# Patient Record
Sex: FEMALE | Race: WHITE | NOT HISPANIC OR LATINO | Employment: FULL TIME | URBAN - NONMETROPOLITAN AREA
[De-identification: names, ages, dates, MRNs, and addresses within clinical notes are randomized per-mention and may not be internally consistent; named-entity substitution may affect disease eponyms.]

---

## 2017-01-17 PROBLEM — C50.919 BREAST CANCER: Status: ACTIVE | Noted: 2017-01-17

## 2017-01-17 PROBLEM — R23.2 HOT FLASHES: Status: ACTIVE | Noted: 2017-01-17

## 2017-01-17 PROBLEM — M79.651 RIGHT THIGH PAIN: Status: ACTIVE | Noted: 2017-01-17

## 2017-01-23 RX ORDER — ANASTROZOLE 1 MG/1
1 TABLET ORAL DAILY
Qty: 90 TABLET | Refills: 3 | Status: SHIPPED | OUTPATIENT
Start: 2017-01-23 | End: 2018-01-22 | Stop reason: SDUPTHER

## 2017-02-10 ENCOUNTER — OFFICE VISIT (OUTPATIENT)
Dept: ONCOLOGY | Facility: CLINIC | Age: 62
End: 2017-02-10

## 2017-02-10 VITALS
DIASTOLIC BLOOD PRESSURE: 69 MMHG | HEIGHT: 67 IN | RESPIRATION RATE: 14 BRPM | WEIGHT: 168 LBS | TEMPERATURE: 98.9 F | SYSTOLIC BLOOD PRESSURE: 138 MMHG | BODY MASS INDEX: 26.37 KG/M2 | HEART RATE: 74 BPM

## 2017-02-10 DIAGNOSIS — Z85.3 HISTORY OF LEFT BREAST CANCER: Primary | ICD-10-CM

## 2017-02-10 PROCEDURE — 99213 OFFICE O/P EST LOW 20 MIN: CPT | Performed by: NURSE PRACTITIONER

## 2017-02-10 RX ORDER — DICLOFENAC SODIUM 75 MG/1
TABLET, DELAYED RELEASE ORAL
Refills: 3 | COMMUNITY
Start: 2017-01-29

## 2017-02-10 RX ORDER — VENLAFAXINE 37.5 MG/1
37.5 TABLET ORAL DAILY
COMMUNITY
End: 2017-04-25 | Stop reason: SDUPTHER

## 2017-02-10 RX ORDER — PRAVASTATIN SODIUM 80 MG/1
TABLET ORAL
Refills: 0 | COMMUNITY
Start: 2017-01-23

## 2017-02-10 RX ORDER — EZETIMIBE 10 MG/1
TABLET ORAL
Refills: 1 | COMMUNITY
Start: 2017-01-31

## 2017-02-10 RX ORDER — LANOLIN ALCOHOL/MO/W.PET/CERES
1000 CREAM (GRAM) TOPICAL DAILY
COMMUNITY

## 2017-02-10 RX ORDER — MELATONIN
1000 DAILY
COMMUNITY

## 2017-02-10 RX ORDER — ASPIRIN 81 MG/1
81 TABLET ORAL DAILY
COMMUNITY

## 2017-02-10 NOTE — PROGRESS NOTES
CHIEF COMPLAINT:  1.  Nausea and vomiting with missed doses of Effexor                                         2.  Follow-up for history of breast cancer    Problem List:  Oncology/Hematology History    1. Breast cancer: Stage IA, [id number], invasive ductal carcinoma of the left  breast. Tumor size 1.1 cm, 2 sentinel nodes removed which were negative, ER/W[place] positive, HER-2/quita negative. Status post lumpectomy and sentinel node dissection 08/14/2013.   a) Began Arimidex 09/2013.   b) Completed postlumpectomy radiation under the direction of Dr. Salomon Mascorro  10/07/2013 through 11/15/2013.   c) BRCA negative per outside report.   d) Low risk Oncotype score of 13 with low risk of distant recurrence of 9%.   2. History of right hip replacement:  a) Has had intermittent right thigh pain since that time followed by  orthopedic surgeon.  3) Hot flashes, worse at night.        Breast cancer    7/31/2013 Initial Diagnosis    Left breast cancer         HISTORY OF PRESENT ILLNESS:  The patient is a 61 y.o. female, here for follow up on management of history of left breast cancer.  Sister states that overall she has been doing well since we saw her last with no new concerns or changes in her health.  She did note however when she has forgotten to take her Effexor that she experiences nausea and feeling poorly.  This resolves when she takes her Effexor.  She states that while she was not trying to stop the medication, she had just forgotten a dose or 2, she is concerned about the side effects and is interested in tapering off.  She does state that they have helped with her hot flashes but her concerns over the side effect outweigh that.  Tolerating Arimidex without side effects.  Previous thigh pain she had been having resolved spontaneously and has not returned.      History reviewed. No pertinent past medical history.  Past Surgical History   Procedure Laterality Date   • Total hip arthroplasty         Allergies   Allergen  "Reactions   • Other      ivp dye    • Sulfa Antibiotics        Family History and Social History reviewed and changed as necessary      REVIEW OF SYSTEM:   Review of Systems   Constitutional: Negative for appetite change, chills, diaphoresis, fatigue, fever and unexpected weight change.   HENT:   Negative for mouth sores, sore throat and trouble swallowing.    Eyes: Negative for icterus.   Respiratory: Negative for cough, hemoptysis and shortness of breath.    Cardiovascular: Negative for chest pain, leg swelling and palpitations.   Gastrointestinal: Negative for abdominal distention, abdominal pain, blood in stool, constipation, diarrhea, nausea and vomiting.   Endocrine: Negative for hot flashes.   Genitourinary: Negative for bladder incontinence, difficulty urinating, dysuria, frequency and hematuria.    Musculoskeletal: Negative for gait problem, neck pain and neck stiffness.   Skin: Negative for rash.   Neurological: Negative for dizziness, gait problem, headaches, light-headedness and numbness.   Hematological: Negative for adenopathy. Does not bruise/bleed easily.   Psychiatric/Behavioral: Negative for depression. The patient is not nervous/anxious.    All other systems reviewed and are negative except as stated above in history of present illness.       PHYSICAL EXAM    Vitals:    02/10/17 0902   BP: 138/69   Pulse: 74   Resp: 14   Temp: 98.9 °F (37.2 °C)   Weight: 168 lb (76.2 kg)   Height: 66.5\" (168.9 cm)     Constitutional: Appears well-developed and well-nourished. No distress.   ECOG: (0) Fully active, able to carry on all predisease performance without restriction  HENT:   Head: Normocephalic.   Mouth/Throat: Oropharynx is clear and moist.   Eyes: Conjunctivae are normal. Pupils are equal, round, and reactive. No scleral icterus.   Neck: Neck supple. No JVD present.    Cardiovascular: Normal rate, regular rhythm and normal heart sounds.    Breast: Breast exam done both in the sitting and supine " position was benign, breast are asymmetric with the left smaller than the right, breast tissue is soft, no abnormal masses or nodules.  Stable lumpectomy and axillary scar bed in the left breast and axilla.  Skin is smooth with no dimpling, nipples are without retraction or discharge.  Pulmonary/Chest: Breath sounds normal. No respiratory distress.   Nodes: No cervical, supraclavicular or axillary nodes palpable on exam.  Abdominal: Soft. Exhibits no distension and no mass. There is no tenderness.   Musculoskeletal:Exhibits no edema, tenderness or deformity.   Neurological: Alert and oriented to person, place, and time. Exhibits normal muscle tone.   Skin: No ecchymosis, no petechiae and no rash noted. Not diaphoretic. No cyanosis. Nails show no clubbing.   Psychiatric: Normal mood and affect.   Vitals reviewed.    Diagnostic data: All previous office notes, and radiology reports including 7/18/16 bilateral diagnostic mammogram were reviewed at visit, July mammogram was BI-RADS 2.    Assessment/Plan     1.  History of left breast cancer  2.  History of hot flashes, better controlled with Effexor but nausea and vomiting with missed doses of Effexor    Discussion: The patient has no evidence of disease on clinical exam and no new worrisome symptoms.  Her last mammogram in July was negative, BI-RADS 2.  She will be due for her next annual mammogram in July which I have ordered today as a screening mammogram.  We will plan on seeing her back in 1 year for follow-up.  She will continue on Arimidex unchanged through September 2018, for 5 years adjuvant therapy.  She will need bone density testing if not done in the past 2-3 years.  In regards to the nausea she has one missing a dose of her Effexor, I discussed with her the best way to come off of the medication would be to take 1 capsule every other day for a week and then stop.  She may still yet experience nausea but we'll just have to work through this for a few days  until it resolves.         Errors in dictation may reflect use of voice recognition software and not all errors in transcription may have been detected prior to signing.    Kirsten Katz, APRN    02/10/2017

## 2017-04-20 ENCOUNTER — TELEPHONE (OUTPATIENT)
Dept: ONCOLOGY | Facility: CLINIC | Age: 62
End: 2017-04-20

## 2017-04-20 NOTE — TELEPHONE ENCOUNTER
----- Message from Julissa eBcerra sent at 4/20/2017  8:26 AM EDT -----  Regarding: morgan   lump on breast  Contact: [phone]  Pt would like a rtn call  She found a lump on her breast.  Hx breast ca.  She works at [employer] but has her cell phone.

## 2017-04-20 NOTE — TELEPHONE ENCOUNTER
Discussed with Dr. Gould.  He is happy to see her, but would just send her back to her surgeon.  He suggested she start with Dr. Bowden.  She will call his office.  I asked her to call me back with update about what he thought was going on.  I told her we could see her today or tomorrow if she is unable to get in to see Dr. Bowden.

## 2017-04-25 RX ORDER — VENLAFAXINE HYDROCHLORIDE 37.5 MG/1
37.5 CAPSULE, EXTENDED RELEASE ORAL DAILY
Qty: 90 CAPSULE | Refills: 3 | Status: SHIPPED | OUTPATIENT
Start: 2017-04-25 | End: 2018-04-05 | Stop reason: SDUPTHER

## 2017-04-25 RX ORDER — VENLAFAXINE 37.5 MG/1
37.5 TABLET ORAL DAILY
Qty: 90 TABLET | Refills: 3 | Status: SHIPPED | OUTPATIENT
Start: 2017-04-25 | End: 2017-04-25

## 2018-01-22 RX ORDER — ANASTROZOLE 1 MG/1
1 TABLET ORAL DAILY
Qty: 90 TABLET | Refills: 2 | Status: SHIPPED | OUTPATIENT
Start: 2018-01-22

## 2018-01-25 ENCOUNTER — TELEPHONE (OUTPATIENT)
Dept: ONCOLOGY | Facility: CLINIC | Age: 63
End: 2018-01-25

## 2018-01-25 NOTE — TELEPHONE ENCOUNTER
----- Message from Trisha MARRUFO Mayito sent at 1/25/2018 11:13 AM EST -----  Regarding: FW: HILARY-REFERRAL  Contact: [phone]      ----- Message -----     From: George Gould MD     Sent: 1/25/2018  11:01 AM       To: Trisha MARRUFO Mayito  Subject: RE: HILARY-REFERRAL                               Tell her to get with her primary care in her local area and they need to make referral.  The primary care can do the mammography and exam but they will know the medical oncologists are in that region.  I do not.  ----- Message -----     From: Lissy Bueno RN     Sent: 1/25/2018   8:29 AM       To: George Gould MD  Subject: FW: HILARY-REFERRAL                                   ----- Message -----     From: Trisha MARRUFO Mayito     Sent: 1/24/2018  11:32 AM       To: [facility]  Subject: HILARY-REFERRAL                                   Patient has moved 3 hours away and she needs a referral to another Oncologist would like a recommendation?

## 2018-04-05 RX ORDER — VENLAFAXINE HYDROCHLORIDE 37.5 MG/1
37.5 CAPSULE, EXTENDED RELEASE ORAL DAILY
Qty: 90 CAPSULE | Refills: 3 | Status: SHIPPED | OUTPATIENT
Start: 2018-04-05

## 2023-06-07 ENCOUNTER — OFFICE VISIT (OUTPATIENT)
Dept: NEUROLOGY | Facility: CLINIC | Age: 68
End: 2023-06-07
Payer: MEDICARE

## 2023-06-07 VITALS
SYSTOLIC BLOOD PRESSURE: 122 MMHG | OXYGEN SATURATION: 98 % | HEIGHT: 66 IN | WEIGHT: 168.2 LBS | DIASTOLIC BLOOD PRESSURE: 78 MMHG | HEART RATE: 68 BPM | BODY MASS INDEX: 27.03 KG/M2

## 2023-06-07 DIAGNOSIS — F41.9 ANXIETY: ICD-10-CM

## 2023-06-07 DIAGNOSIS — R41.3 MEMORY LOSS: Primary | ICD-10-CM

## 2023-06-07 PROBLEM — F32.A DEPRESSION: Status: ACTIVE | Noted: 2023-06-07

## 2023-06-07 PROCEDURE — 99204 OFFICE O/P NEW MOD 45 MIN: CPT | Performed by: PSYCHIATRY & NEUROLOGY

## 2023-06-07 PROCEDURE — 1159F MED LIST DOCD IN RCRD: CPT | Performed by: PSYCHIATRY & NEUROLOGY

## 2023-06-07 PROCEDURE — 1160F RVW MEDS BY RX/DR IN RCRD: CPT | Performed by: PSYCHIATRY & NEUROLOGY

## 2023-06-07 RX ORDER — FLUOXETINE HYDROCHLORIDE 20 MG/1
CAPSULE ORAL EVERY 24 HOURS
COMMUNITY
End: 2023-06-07

## 2023-06-07 RX ORDER — ESCITALOPRAM OXALATE 10 MG/1
TABLET ORAL
COMMUNITY
Start: 2023-04-28 | End: 2023-06-07

## 2023-06-07 RX ORDER — ROSUVASTATIN CALCIUM 5 MG/1
5 TABLET, COATED ORAL DAILY
COMMUNITY

## 2023-06-07 RX ORDER — GLUCOSAMINE/CHONDR SU A SOD 750-600 MG
2 TABLET ORAL DAILY
COMMUNITY
End: 2023-06-07

## 2023-06-07 RX ORDER — MULTIPLE VITAMINS W/ MINERALS TAB 9MG-400MCG
1 TAB ORAL DAILY
COMMUNITY

## 2023-06-07 NOTE — PROGRESS NOTES
Subjective   Patient ID: Yajaira Huerta is a 68 y.o. female     Chief Complaint   Patient presents with    Memory Loss        History of Present Illness    68 y.o. female referred by Gerry Alvarado MD for AMS.       and stopped working.      Increased anxiety and mild depression.      Trouble with loud spaces.     Tearful.      Trial of Lexapro, Effexor.      MMSE 29     Breast cancer tx with radiation and lumpectomy.    Feet hurt and left heel numb.      Reviewed medical records:    Family reports cognitive changes.  Sx progressed started in .  Forgets names. Loses train of thought.  Cannot find car in parking lot.  Trouble with routine tasks.        Past Medical History:   Diagnosis Date    Breast cancer     Depression      History reviewed. No pertinent family history.  Social History     Socioeconomic History    Marital status:    Tobacco Use    Smoking status: Never     Passive exposure: Never    Smokeless tobacco: Never   Vaping Use    Vaping Use: Never used   Substance and Sexual Activity    Alcohol use: No    Drug use: Defer    Sexual activity: Defer       Review of Systems   Constitutional:  Negative for activity change, fatigue and unexpected weight change.   HENT:  Negative for tinnitus and trouble swallowing.    Eyes:  Negative for photophobia and visual disturbance.   Respiratory:  Negative for apnea, cough and choking.    Cardiovascular:  Negative for leg swelling.   Gastrointestinal:  Negative for nausea and vomiting.   Endocrine: Negative for cold intolerance and heat intolerance.   Genitourinary:  Negative for difficulty urinating, frequency, menstrual problem and urgency.   Musculoskeletal:  Negative for back pain, gait problem, myalgias and neck pain.   Skin:  Negative for color change and rash.   Allergic/Immunologic: Negative for immunocompromised state.   Neurological:  Positive for speech difficulty. Negative for dizziness, tremors, seizures, syncope, facial  "asymmetry, weakness, light-headedness, numbness and headaches.   Hematological:  Negative for adenopathy. Does not bruise/bleed easily.   Psychiatric/Behavioral:  Positive for confusion and decreased concentration. Negative for behavioral problems, hallucinations and sleep disturbance. The patient is nervous/anxious.      Objective     Vitals:    06/07/23 0931   BP: 122/78   Pulse: 68   SpO2: 98%   Weight: 76.3 kg (168 lb 3.2 oz)   Height: 168.9 cm (66.5\")       Neurologic Exam     Mental Status   Oriented to person, place, and time.   Speech: speech is normal   Level of consciousness: alert  Knowledge: good and consistent with education.   Normal comprehension.     Cranial Nerves   Cranial nerves II through XII intact.     CN II   Visual fields full to confrontation.   Visual acuity: normal  Right visual field deficit: none  Left visual field deficit: none     CN III, IV, VI   Pupils are equal, round, and reactive to light.  Extraocular motions are normal.   Nystagmus: none   Diplopia: none  Ophthalmoparesis: none  Upgaze: normal  Downgaze: normal  Conjugate gaze: present    CN V   Facial sensation intact.   Right corneal reflex: normal  Left corneal reflex: normal    CN VII   Right facial weakness: none  Left facial weakness: none    CN VIII   Hearing: intact    CN IX, X   Palate: symmetric  Right gag reflex: normal  Left gag reflex: normal    CN XI   Right sternocleidomastoid strength: normal  Left sternocleidomastoid strength: normal    CN XII   Tongue: not atrophic  Fasciculations: absent  Tongue deviation: none    Motor Exam   Muscle bulk: normal  Overall muscle tone: normal    Strength   Strength 5/5 throughout.     Sensory Exam   Light touch normal.     Gait, Coordination, and Reflexes     Gait  Gait: normal    Tremor   Resting tremor: absent  Intention tremor: absent  Action tremor: absent    Reflexes   Reflexes 2+ except as noted.     Physical Exam  Eyes:      Extraocular Movements: EOM normal.      " Pupils: Pupils are equal, round, and reactive to light.   Neurological:      Mental Status: She is oriented to person, place, and time.      Cranial Nerves: Cranial nerves 2-12 are intact.      Motor: Motor strength is normal.      Gait: Gait is intact.   Psychiatric:         Speech: Speech normal.       No results found for any previous visit.         Assessment & Plan     Problem List Items Addressed This Visit          Neuro    Memory loss - Primary (Chronic)    Current Assessment & Plan     Sx secondary to anxiety     Refer to Psychiatry                No follow-ups on file.

## 2024-08-22 ENCOUNTER — OFFICE VISIT (OUTPATIENT)
Dept: OBSTETRICS AND GYNECOLOGY | Facility: CLINIC | Age: 69
End: 2024-08-22
Payer: MEDICARE

## 2024-08-22 VITALS — SYSTOLIC BLOOD PRESSURE: 120 MMHG | DIASTOLIC BLOOD PRESSURE: 80 MMHG | WEIGHT: 141 LBS

## 2024-08-22 DIAGNOSIS — N64.9 DISORDER OF BREAST, UNSPECIFIED: ICD-10-CM

## 2024-08-22 DIAGNOSIS — N63.10 MASS OF RIGHT BREAST, UNSPECIFIED QUADRANT: Primary | ICD-10-CM

## 2024-08-22 PROCEDURE — 1159F MED LIST DOCD IN RCRD: CPT | Performed by: NURSE PRACTITIONER

## 2024-08-22 PROCEDURE — 99202 OFFICE O/P NEW SF 15 MIN: CPT | Performed by: NURSE PRACTITIONER

## 2024-08-22 PROCEDURE — 1036F TOBACCO NON-USER: CPT | Performed by: NURSE PRACTITIONER

## 2024-08-22 RX ORDER — DULOXETIN HYDROCHLORIDE 30 MG/1
30 CAPSULE, DELAYED RELEASE ORAL DAILY
COMMUNITY
Start: 2024-07-10

## 2024-08-22 RX ORDER — ROSUVASTATIN CALCIUM 20 MG/1
20 TABLET, COATED ORAL DAILY
COMMUNITY
Start: 2024-05-23

## 2024-08-22 NOTE — PROGRESS NOTES
Subjective   Patient ID: Tabitha Hadley is a 69 y.o. female who presents for Breast Problem (NP- self referred/B/L breast Pain x 6 days/- patient declined a chaperone-/Pt reports normal screening mammogram in [place] 10/2023).  Breast Problem  Chronicity:  New  Associated Symptoms: breast mass and tenderness    Associated Symptoms: no breast redness, no skin change and no swelling    Affected side:  Both  Onset:  In the past 7 days  Progression since onset:  Unchanged  Practices self breast exam: yes  Risk factors: family history of breast cancer and radiation exposure    Here with c/o breast pain that started abruptly on Saturday. Initially was in the right breast; then moved to the left breast. Initially rated the pain 8/10. Now rates it a 3-4/10. Feels it is a 2/10 now.    Feels there is a lump in the right breast near the nipple. She denies any change in activity prior to the onset of her pain.     Two sisters had a hx of breast cancer.     Persona lhx of breast cancer in 2013 and subsequent lumpectomy in the left breast. Had radiation afterwards.     Review of Systems   All other systems reviewed and are negative.      Objective   Physical Exam  Constitutional:       Appearance: Normal appearance.   Chest:   Breasts:     Right: Mass present. No nipple discharge, skin change or tenderness.      Comments: Left breast- s/p lumpectomy.   Lymphadenopathy:      Upper Body:      Right upper body: No supraclavicular adenopathy.      Left upper body: No supraclavicular adenopathy.   Neurological:      Mental Status: She is alert.         Assessment/Plan   Diagnoses and all orders for this visit:  Mass of right breast, unspecified quadrant  -     BI mammo bilateral diagnostic tomosynthesis; Future  -     BI US breast limited right; Future  Disorder of breast, unspecified  -     BI mammo bilateral diagnostic tomosynthesis; Future  -     BI US breast limited right; Future  Follow-up pending results of imaging.        Arely ESPINOSA  Loewit, APRN-CNP 08/22/24 4:01 PM

## 2024-09-10 ENCOUNTER — HOSPITAL ENCOUNTER (OUTPATIENT)
Dept: RADIOLOGY | Facility: HOSPITAL | Age: 69
Discharge: HOME | End: 2024-09-10
Payer: MEDICARE

## 2024-09-10 VITALS — WEIGHT: 141 LBS | HEIGHT: 66 IN | BODY MASS INDEX: 22.66 KG/M2

## 2024-09-10 DIAGNOSIS — N63.10 MASS OF RIGHT BREAST, UNSPECIFIED QUADRANT: ICD-10-CM

## 2024-09-10 DIAGNOSIS — N64.9 DISORDER OF BREAST, UNSPECIFIED: ICD-10-CM

## 2024-09-10 PROCEDURE — G0279 TOMOSYNTHESIS, MAMMO: HCPCS

## 2024-09-10 PROCEDURE — 76981 USE PARENCHYMA: CPT | Mod: LT

## 2024-09-10 PROCEDURE — 76642 ULTRASOUND BREAST LIMITED: CPT | Mod: 50

## 2024-09-10 PROCEDURE — 76642 ULTRASOUND BREAST LIMITED: CPT

## 2024-09-10 PROCEDURE — 77062 BREAST TOMOSYNTHESIS BI: CPT

## 2024-09-10 PROCEDURE — 77066 DX MAMMO INCL CAD BI: CPT
